# Patient Record
Sex: MALE | Race: WHITE | NOT HISPANIC OR LATINO
[De-identification: names, ages, dates, MRNs, and addresses within clinical notes are randomized per-mention and may not be internally consistent; named-entity substitution may affect disease eponyms.]

---

## 2021-04-25 ENCOUNTER — TRANSCRIPTION ENCOUNTER (OUTPATIENT)
Age: 61
End: 2021-04-25

## 2022-06-26 ENCOUNTER — NON-APPOINTMENT (OUTPATIENT)
Age: 62
End: 2022-06-26

## 2022-09-07 ENCOUNTER — APPOINTMENT (OUTPATIENT)
Dept: SURGICAL ONCOLOGY | Facility: CLINIC | Age: 62
End: 2022-09-07

## 2022-09-07 VITALS
SYSTOLIC BLOOD PRESSURE: 137 MMHG | DIASTOLIC BLOOD PRESSURE: 80 MMHG | TEMPERATURE: 97.9 F | HEART RATE: 63 BPM | WEIGHT: 195 LBS | RESPIRATION RATE: 15 BRPM | HEIGHT: 74 IN | OXYGEN SATURATION: 97 % | BODY MASS INDEX: 25.03 KG/M2

## 2022-09-07 PROCEDURE — 99204 OFFICE O/P NEW MOD 45 MIN: CPT

## 2023-01-09 ENCOUNTER — NON-APPOINTMENT (OUTPATIENT)
Age: 63
End: 2023-01-09

## 2023-02-08 ENCOUNTER — APPOINTMENT (OUTPATIENT)
Dept: SURGICAL ONCOLOGY | Facility: CLINIC | Age: 63
End: 2023-02-08
Payer: COMMERCIAL

## 2023-02-08 VITALS
HEIGHT: 74 IN | BODY MASS INDEX: 25.28 KG/M2 | OXYGEN SATURATION: 98 % | RESPIRATION RATE: 16 BRPM | DIASTOLIC BLOOD PRESSURE: 77 MMHG | TEMPERATURE: 97.7 F | SYSTOLIC BLOOD PRESSURE: 122 MMHG | WEIGHT: 197 LBS | HEART RATE: 80 BPM

## 2023-02-08 PROCEDURE — 99214 OFFICE O/P EST MOD 30 MIN: CPT

## 2023-02-08 NOTE — CONSULT LETTER
[Dear  ___] : Dear  [unfilled], [Consult Letter:] : I had the pleasure of evaluating your patient, [unfilled]. [Please see my note below.] : Please see my note below. [Sincerely,] : Sincerely, [FreeTextEntry3] : Kan Burris MD FACS\par

## 2023-02-08 NOTE — PHYSICAL EXAM
[Normal] : supple, no neck mass and thyroid not enlarged [Normal Neck Lymph Nodes] : normal neck lymph nodes  [Normal Supraclavicular Lymph Nodes] : normal supraclavicular lymph nodes [Normal Groin Lymph Nodes] : normal groin lymph nodes [Normal Axillary Lymph Nodes] : normal axillary lymph nodes [Normal] : oriented to person, place and time, with appropriate affect [de-identified] : Abdomen soft, non-tender, no masses, no hepatosplenomegaly, and no ascites.

## 2023-02-08 NOTE — ADDENDUM
[FreeTextEntry1] : I, Barbara Arellano, acted solely as a scribe for Dr. Kan Burris on this date 02/08/2023.\par \par

## 2023-02-08 NOTE — ASSESSMENT
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

## 2023-02-08 NOTE — PHYSICAL EXAM
[Normal] : supple, no neck mass and thyroid not enlarged [Normal Neck Lymph Nodes] : normal neck lymph nodes  [Normal Supraclavicular Lymph Nodes] : normal supraclavicular lymph nodes [Normal Groin Lymph Nodes] : normal groin lymph nodes [Normal Axillary Lymph Nodes] : normal axillary lymph nodes [Normal] : oriented to person, place and time, with appropriate affect [de-identified] : Abdomen soft, non-tender, no masses, no hepatosplenomegaly, and no ascites.

## 2023-02-08 NOTE — ADDENDUM
[FreeTextEntry1] : I, Barbara Arellano, acted solely as a scribe for Dr. Kan Burris on this date 09/07/2022.\par

## 2023-02-08 NOTE — ASSESSMENT
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

## 2023-02-08 NOTE — HISTORY OF PRESENT ILLNESS
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 He denies any pain in the abdomen and pelvic area. Denies fever, chills or trouble urinating.  \par \par MRI abdomen/pelvis (2/07/23): Stable Bosniak 2 F cyst in the right kidney. Other simple bilateral renal cysts. Stable cystic lesion along the left pelvic sidewall which likely represents a small lymphatic malformation. \par \par MRI prostate (8/2/22): PI-RADS 2. Area of linear low T2 signal throughout the peripheral zone, likely representing areas of inflammation/fibrosis. Cystic lesion superior to the left seminal vesicle abutting the left internal iliac vasculature, likely representing a small lymphatic malformation. Consider 6 month follow up to ensure stability. \par \par CT abdomen (8/2/22): Bosniak 2F cysts in the right kidney measuring up to 7.8 cm. Recommend 6 month follow up with MRI if the patient is compatible. Alternatively, a renal protocol CT can be obtained in 6 months. No aggressive osseous lesions. Other simple bilateral renal cysts. \par \par Pelvic ultrasound (7/12/22): Mildly enlarged prostate at 44 mL with a 92 mL post void residual volume. Probable left seminal vesicle cyst measures 1.4 cm, not significantly changed. \par \par PMHx: Hernia repair, allergies to Penicillin

## 2023-02-08 NOTE — HISTORY OF PRESENT ILLNESS
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 He denies any pain in the abdomen and pelvic area. \par \par MRI prostate (8/2/22): PI-RADS 2. Area of linear low T2 signal throughout the peripheral zone, likely representing areas of inflammation/fibrosis. Cystic lesion superior to the left seminal vesicle abutting the left internal iliac vasculature, likely representing a small lymphatic malformation. Consider 6 month follow up to ensure stability. \par \par CT abdomen (8/2/22): Bosniak 2F cysts in the right kidney measuring up to 7.8 cm. Recommend 6 month follow up with MRI if the patient is compatible. Alternatively, a renal protocol CT can be obtained in 6 months. No aggressive osseous lesions. Other simple bilateral renal cysts. \par \par Pelvic ultrasound (7/12/22): Mildly enlarged prostate at 44 mL with a 92 mL post void residual volume. Probable left seminal vesicle cyst measures 1.4 cm, not significantly changed. \par \par PMHx: Hernia repair, allergies to Penicillin

## 2023-03-13 ENCOUNTER — NON-APPOINTMENT (OUTPATIENT)
Age: 63
End: 2023-03-13

## 2023-08-29 ENCOUNTER — NON-APPOINTMENT (OUTPATIENT)
Age: 63
End: 2023-08-29

## 2023-08-30 ENCOUNTER — APPOINTMENT (OUTPATIENT)
Dept: SURGICAL ONCOLOGY | Facility: CLINIC | Age: 63
End: 2023-08-30
Payer: MEDICARE

## 2023-08-30 VITALS
SYSTOLIC BLOOD PRESSURE: 117 MMHG | OXYGEN SATURATION: 98 % | DIASTOLIC BLOOD PRESSURE: 75 MMHG | HEART RATE: 68 BPM | HEIGHT: 74 IN | RESPIRATION RATE: 17 BRPM | WEIGHT: 192 LBS | BODY MASS INDEX: 24.64 KG/M2

## 2023-08-30 PROCEDURE — 99214 OFFICE O/P EST MOD 30 MIN: CPT

## 2023-08-30 NOTE — PHYSICAL EXAM
[Normal] : supple, no neck mass and thyroid not enlarged [Normal Neck Lymph Nodes] : normal neck lymph nodes  [Normal Supraclavicular Lymph Nodes] : normal supraclavicular lymph nodes [Normal Groin Lymph Nodes] : normal groin lymph nodes [Normal Axillary Lymph Nodes] : normal axillary lymph nodes [Normal] : oriented to person, place and time, with appropriate affect [de-identified] : Abdomen soft, non-tender, no masses, no hepatosplenomegaly, and no ascites.

## 2023-08-30 NOTE — ADDENDUM
[FreeTextEntry1] : I, Barbara Arellano, acted solely as a scribe for Dr. Kan Burris on this date 08/30/2023.  Unknown

## 2023-08-30 NOTE — HISTORY OF PRESENT ILLNESS
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 He denies any pain in the abdomen and pelvic area. Denies fever, chills or trouble urinating.    Pelvic MRI (8/9/23): Stable Bosniak 2 F cyst in the right kidney.   MRI abdomen/pelvis (2/07/23): Stable Bosniak 2 F cyst in the right kidney. Other simple bilateral renal cysts. Stable cystic lesion along the left pelvic sidewall which likely represents a small lymphatic malformation.   MRI prostate (8/2/22): PI-RADS 2. Area of linear low T2 signal throughout the peripheral zone, likely representing areas of inflammation/fibrosis. Cystic lesion superior to the left seminal vesicle abutting the left internal iliac vasculature, likely representing a small lymphatic malformation. Consider 6 month follow up to ensure stability.   CT abdomen (8/2/22): Bosniak 2F cysts in the right kidney measuring up to 7.8 cm. Recommend 6 month follow up with MRI if the patient is compatible. Alternatively, a renal protocol CT can be obtained in 6 months. No aggressive osseous lesions. Other simple bilateral renal cysts.   Pelvic ultrasound (7/12/22): Mildly enlarged prostate at 44 mL with a 92 mL post void residual volume. Probable left seminal vesicle cyst measures 1.4 cm, not significantly changed.   PMHx: Hernia repair, allergies to Penicillin

## 2023-08-30 NOTE — ASSESSMENT
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

## 2023-09-20 ENCOUNTER — APPOINTMENT (OUTPATIENT)
Dept: SURGICAL ONCOLOGY | Facility: CLINIC | Age: 63
End: 2023-09-20

## 2024-02-07 ENCOUNTER — APPOINTMENT (OUTPATIENT)
Dept: SURGICAL ONCOLOGY | Facility: CLINIC | Age: 64
End: 2024-02-07
Payer: MEDICARE

## 2024-02-07 VITALS
SYSTOLIC BLOOD PRESSURE: 117 MMHG | HEIGHT: 74 IN | BODY MASS INDEX: 24.64 KG/M2 | WEIGHT: 192 LBS | OXYGEN SATURATION: 98 % | HEART RATE: 68 BPM | DIASTOLIC BLOOD PRESSURE: 71 MMHG

## 2024-02-07 DIAGNOSIS — R19.00 INTRA-ABDOMINAL AND PELVIC SWELLING, MASS AND LUMP, UNSPECIFIED SITE: ICD-10-CM

## 2024-02-07 PROCEDURE — 99214 OFFICE O/P EST MOD 30 MIN: CPT

## 2024-02-07 NOTE — ASSESSMENT
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

## 2024-02-07 NOTE — PHYSICAL EXAM
[Normal] : supple, no neck mass and thyroid not enlarged [Normal Neck Lymph Nodes] : normal neck lymph nodes  [Normal Supraclavicular Lymph Nodes] : normal supraclavicular lymph nodes [Normal Groin Lymph Nodes] : normal groin lymph nodes [Normal Axillary Lymph Nodes] : normal axillary lymph nodes [Normal] : oriented to person, place and time, with appropriate affect [de-identified] : Abdomen soft, non-tender, no masses, no hepatosplenomegaly, and no ascites.

## 2024-02-07 NOTE — HISTORY OF PRESENT ILLNESS
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 He denies any pain in the abdomen and pelvic area. Denies fever, chills or trouble urinating. He continues follow up with Dr. Goldberg.   MRI (1/30/24): Stable Bosniak 2 F and Bosniak 1 right renal cysts. No solid renal mass. Stable cystic structure along the left pelvic sidewall which may represent a lymphatic malformation.  Pelvic MRI (8/9/23): Stable Bosniak 2 F cyst in the right kidney.   MRI abdomen/pelvis (2/07/23): Stable Bosniak 2 F cyst in the right kidney. Other simple bilateral renal cysts. Stable cystic lesion along the left pelvic sidewall which likely represents a small lymphatic malformation.   MRI prostate (8/2/22): PI-RADS 2. Area of linear low T2 signal throughout the peripheral zone, likely representing areas of inflammation/fibrosis. Cystic lesion superior to the left seminal vesicle abutting the left internal iliac vasculature, likely representing a small lymphatic malformation. Consider 6 month follow up to ensure stability.   CT abdomen (8/2/22): Bosniak 2F cysts in the right kidney measuring up to 7.8 cm. Recommend 6 month follow up with MRI if the patient is compatible. Alternatively, a renal protocol CT can be obtained in 6 months. No aggressive osseous lesions. Other simple bilateral renal cysts.   Pelvic ultrasound (7/12/22): Mildly enlarged prostate at 44 mL with a 92 mL post void residual volume. Probable left seminal vesicle cyst measures 1.4 cm, not significantly changed.   PMHx: Hernia repair, allergies to Penicillin

## 2024-02-07 NOTE — ADDENDUM
[FreeTextEntry1] : I, Barbara Arellano, acted solely as a scribe for Dr. Kan Burris on this date 02/07/2024.

## 2025-02-03 ENCOUNTER — NON-APPOINTMENT (OUTPATIENT)
Age: 65
End: 2025-02-03

## 2025-02-05 ENCOUNTER — APPOINTMENT (OUTPATIENT)
Dept: SURGICAL ONCOLOGY | Facility: CLINIC | Age: 65
End: 2025-02-05

## 2025-02-05 VITALS
DIASTOLIC BLOOD PRESSURE: 70 MMHG | OXYGEN SATURATION: 97 % | HEIGHT: 74 IN | RESPIRATION RATE: 17 BRPM | WEIGHT: 195 LBS | HEART RATE: 63 BPM | SYSTOLIC BLOOD PRESSURE: 119 MMHG | BODY MASS INDEX: 25.03 KG/M2

## 2025-02-05 DIAGNOSIS — R19.00 INTRA-ABDOMINAL AND PELVIC SWELLING, MASS AND LUMP, UNSPECIFIED SITE: ICD-10-CM

## 2025-02-05 PROCEDURE — 99215 OFFICE O/P EST HI 40 MIN: CPT

## 2025-02-21 RX ORDER — BLOOD SUGAR DIAGNOSTIC
100 STRIP MISCELLANEOUS
Refills: 0 | Status: ACTIVE | COMMUNITY

## 2025-02-21 RX ORDER — SILDENAFIL CITRATE 100 MG/1
TABLET, FILM COATED ORAL
Refills: 0 | Status: ACTIVE | COMMUNITY

## 2025-02-21 RX ORDER — ALBUTEROL SULFATE 90 UG/1
INHALANT RESPIRATORY (INHALATION)
Refills: 0 | Status: ACTIVE | COMMUNITY

## 2025-02-21 RX ORDER — FAMOTIDINE 10 MG/1
TABLET, FILM COATED ORAL
Refills: 0 | Status: ACTIVE | COMMUNITY

## 2025-02-21 RX ORDER — MOMETASONE FUROATE AND FORMOTEROL FUMARATE DIHYDRATE 50; 5 UG/1; UG/1
AEROSOL RESPIRATORY (INHALATION)
Refills: 0 | Status: ACTIVE | COMMUNITY

## 2025-02-26 ENCOUNTER — APPOINTMENT (OUTPATIENT)
Dept: INTERVENTIONAL RADIOLOGY/VASCULAR | Facility: CLINIC | Age: 65
End: 2025-02-26
Payer: MEDICARE

## 2025-02-26 VITALS
BODY MASS INDEX: 24.77 KG/M2 | SYSTOLIC BLOOD PRESSURE: 116 MMHG | WEIGHT: 193 LBS | HEART RATE: 79 BPM | RESPIRATION RATE: 16 BRPM | HEIGHT: 74 IN | DIASTOLIC BLOOD PRESSURE: 76 MMHG | OXYGEN SATURATION: 96 %

## 2025-02-26 DIAGNOSIS — Z87.891 PERSONAL HISTORY OF NICOTINE DEPENDENCE: ICD-10-CM

## 2025-02-26 DIAGNOSIS — R19.00 INTRA-ABDOMINAL AND PELVIC SWELLING, MASS AND LUMP, UNSPECIFIED SITE: ICD-10-CM

## 2025-02-26 DIAGNOSIS — Z87.438 PERSONAL HISTORY OF OTHER DISEASES OF MALE GENITAL ORGANS: ICD-10-CM

## 2025-02-26 PROCEDURE — 99204 OFFICE O/P NEW MOD 45 MIN: CPT

## 2025-02-27 LAB
ANION GAP SERPL CALC-SCNC: 12 MMOL/L
BUN SERPL-MCNC: 17 MG/DL
CALCIUM SERPL-MCNC: 9.3 MG/DL
CHLORIDE SERPL-SCNC: 105 MMOL/L
CO2 SERPL-SCNC: 22 MMOL/L
CREAT SERPL-MCNC: 0.8 MG/DL
EGFR: 97 ML/MIN/1.73M2
GLUCOSE SERPL-MCNC: 89 MG/DL
HCT VFR BLD CALC: 42.4 %
HGB BLD-MCNC: 13.9 G/DL
MCHC RBC-ENTMCNC: 27 PG
MCHC RBC-ENTMCNC: 32.8 G/DL
MCV RBC AUTO: 82.3 FL
PLATELET # BLD AUTO: 196 K/UL
POTASSIUM SERPL-SCNC: 4.3 MMOL/L
RBC # BLD: 5.15 M/UL
RBC # FLD: 14.8 %
SODIUM SERPL-SCNC: 138 MMOL/L
WBC # FLD AUTO: 4.81 K/UL

## 2025-03-03 ENCOUNTER — OUTPATIENT (OUTPATIENT)
Dept: OUTPATIENT SERVICES | Facility: HOSPITAL | Age: 65
LOS: 1 days | End: 2025-03-03
Payer: MEDICARE

## 2025-03-03 ENCOUNTER — RESULT REVIEW (OUTPATIENT)
Age: 65
End: 2025-03-03

## 2025-03-03 VITALS
RESPIRATION RATE: 18 BRPM | DIASTOLIC BLOOD PRESSURE: 79 MMHG | HEART RATE: 60 BPM | OXYGEN SATURATION: 97 % | SYSTOLIC BLOOD PRESSURE: 113 MMHG

## 2025-03-03 VITALS
HEART RATE: 73 BPM | DIASTOLIC BLOOD PRESSURE: 80 MMHG | RESPIRATION RATE: 18 BRPM | SYSTOLIC BLOOD PRESSURE: 125 MMHG | OXYGEN SATURATION: 100 % | WEIGHT: 195.11 LBS | HEIGHT: 74 IN | TEMPERATURE: 96 F

## 2025-03-03 DIAGNOSIS — R19.00 INTRA-ABDOMINAL AND PELVIC SWELLING, MASS AND LUMP, UNSPECIFIED SITE: ICD-10-CM

## 2025-03-03 PROCEDURE — 88307 TISSUE EXAM BY PATHOLOGIST: CPT | Mod: 26

## 2025-03-03 PROCEDURE — 77012 CT SCAN FOR NEEDLE BIOPSY: CPT | Mod: 26

## 2025-03-03 PROCEDURE — 88342 IMHCHEM/IMCYTCHM 1ST ANTB: CPT | Mod: 26

## 2025-03-03 PROCEDURE — 88341 IMHCHEM/IMCYTCHM EA ADD ANTB: CPT | Mod: 26

## 2025-03-03 PROCEDURE — 49180 BIOPSY ABDOMINAL MASS: CPT

## 2025-03-03 RX ORDER — FINASTERIDE 1 MG/1
0 TABLET, FILM COATED ORAL
Refills: 0 | DISCHARGE

## 2025-03-03 RX ORDER — FENTANYL CITRATE-0.9 % NACL/PF 100MCG/2ML
25 SYRINGE (ML) INTRAVENOUS
Refills: 0 | Status: DISCONTINUED | OUTPATIENT
Start: 2025-03-03 | End: 2025-03-03

## 2025-03-03 RX ORDER — TADALAFIL 20 MG/1
0 TABLET, FILM COATED ORAL
Refills: 0 | DISCHARGE

## 2025-03-03 RX ORDER — ALPRAZOLAM 0.5 MG
0 TABLET, EXTENDED RELEASE 24 HR ORAL
Refills: 0 | DISCHARGE

## 2025-03-03 RX ORDER — ALBUTEROL SULFATE 2.5 MG/3ML
2 VIAL, NEBULIZER (ML) INHALATION
Refills: 0 | DISCHARGE

## 2025-03-03 RX ORDER — ROSUVASTATIN CALCIUM 20 MG/1
0 TABLET, FILM COATED ORAL
Refills: 0 | DISCHARGE

## 2025-03-03 RX ORDER — HYDROMORPHONE/SOD CHLOR,ISO/PF 2 MG/10 ML
0.5 SYRINGE (ML) INJECTION
Refills: 0 | Status: DISCONTINUED | OUTPATIENT
Start: 2025-03-03 | End: 2025-03-03

## 2025-03-03 RX ORDER — MOMETASONE FUROATE AND FORMOTEROL FUMARATE DIHYDRATE 50; 5 UG/1; UG/1
2 AEROSOL RESPIRATORY (INHALATION)
Refills: 0 | DISCHARGE

## 2025-03-03 RX ORDER — TRIAMCINOLONE ACETONIDE 55 UG/1
0 SPRAY, METERED NASAL
Refills: 0 | DISCHARGE

## 2025-03-03 RX ORDER — ERGOCALCIFEROL 1.25 MG/1
0 CAPSULE ORAL
Refills: 0 | DISCHARGE

## 2025-03-03 RX ORDER — SILDENAFIL 50 MG/1
0 TABLET, FILM COATED ORAL
Refills: 0 | DISCHARGE

## 2025-03-03 RX ORDER — BUTYROSPERMUM PARKII(SHEA BUTTER), SIMMONDSIA CHINENSIS (JOJOBA) SEED OIL, ALOE BARBADENSIS LEAF EXTRACT .01; 1; 3.5 G/100G; G/100G; G/100G
1 LIQUID TOPICAL
Refills: 0 | DISCHARGE

## 2025-03-03 NOTE — PROCEDURE NOTE - PROCEDURE FINDINGS AND DETAILS
Successful CT guided pelvic mass biopsy.   Multiple cores given to cytopath.   Pt tolerated the procedure well without immediate complication.

## 2025-03-03 NOTE — PRE PROCEDURE NOTE - PRE PROCEDURE EVALUATION
Interventional Radiology    HPI: 65y Male with incidental pelvic mass referred for biopsy.     Allergies: penicillin G benzathine (Rash)    Medications (Abx/Cardiac/Anticoagulation/Blood Products)      Data:  188  88.5  T(C): 35.4  HR: 73  BP: 125/80  RR: 18  SpO2: 100%    Exam  General: No acute distress  Chest: Non labored breathing  Abdomen: Non-distended  Extremities: No swelling, warm          Imaging:   - relevant imaging reviewed     Plan:     -- Plan for pelvic mass biopsy   -- Relevant imaging and labs were reviewed.   -- Risks, benefits, and alternatives were explained to the patient and informed consent was obtained.

## 2025-03-07 LAB — NON-GYNECOLOGICAL CYTOLOGY STUDY: SIGNIFICANT CHANGE UP

## 2025-03-12 DIAGNOSIS — R19.00 INTRA-ABDOMINAL AND PELVIC SWELLING, MASS AND LUMP, UNSPECIFIED SITE: ICD-10-CM

## 2025-07-11 ENCOUNTER — OUTPATIENT (OUTPATIENT)
Dept: OUTPATIENT SERVICES | Facility: HOSPITAL | Age: 65
LOS: 1 days | End: 2025-07-11
Payer: MEDICARE

## 2025-07-11 ENCOUNTER — APPOINTMENT (OUTPATIENT)
Dept: MRI IMAGING | Facility: CLINIC | Age: 65
End: 2025-07-11

## 2025-07-11 DIAGNOSIS — R19.00 INTRA-ABDOMINAL AND PELVIC SWELLING, MASS AND LUMP, UNSPECIFIED SITE: ICD-10-CM

## 2025-07-11 PROCEDURE — 74183 MRI ABD W/O CNTR FLWD CNTR: CPT

## 2025-07-11 PROCEDURE — 74183 MRI ABD W/O CNTR FLWD CNTR: CPT | Mod: 26

## 2025-07-11 PROCEDURE — 72197 MRI PELVIS W/O & W/DYE: CPT

## 2025-07-11 PROCEDURE — A9585: CPT

## 2025-07-11 PROCEDURE — 72197 MRI PELVIS W/O & W/DYE: CPT | Mod: 26

## 2025-08-05 ENCOUNTER — NON-APPOINTMENT (OUTPATIENT)
Age: 65
End: 2025-08-05

## 2025-08-05 DIAGNOSIS — R19.00 INTRA-ABDOMINAL AND PELVIC SWELLING, MASS AND LUMP, UNSPECIFIED SITE: ICD-10-CM
